# Patient Record
Sex: FEMALE | Race: BLACK OR AFRICAN AMERICAN | NOT HISPANIC OR LATINO | Employment: UNEMPLOYED | URBAN - METROPOLITAN AREA
[De-identification: names, ages, dates, MRNs, and addresses within clinical notes are randomized per-mention and may not be internally consistent; named-entity substitution may affect disease eponyms.]

---

## 2017-10-20 ENCOUNTER — HOSPITAL ENCOUNTER (EMERGENCY)
Facility: OTHER | Age: 52
Discharge: HOME | End: 2017-10-20
Attending: EMERGENCY MEDICINE
Payer: MEDICAID

## 2017-10-20 VITALS
TEMPERATURE: 98 F | RESPIRATION RATE: 18 BRPM | WEIGHT: 253 LBS | OXYGEN SATURATION: 100 % | SYSTOLIC BLOOD PRESSURE: 137 MMHG | BODY MASS INDEX: 38.34 KG/M2 | HEIGHT: 68 IN | DIASTOLIC BLOOD PRESSURE: 96 MMHG | HEART RATE: 87 BPM

## 2017-10-20 DIAGNOSIS — H60.501 ACUTE OTITIS EXTERNA OF RIGHT EAR, UNSPECIFIED TYPE: Primary | ICD-10-CM

## 2017-10-20 PROCEDURE — 99283 EMERGENCY DEPT VISIT LOW MDM: CPT

## 2017-10-20 RX ORDER — AMLODIPINE BESYLATE 5 MG/1
5 TABLET ORAL DAILY
COMMUNITY

## 2017-10-20 RX ORDER — NEOMYCIN SULFATE, POLYMYXIN B SULFATE AND HYDROCORTISONE 10; 3.5; 1 MG/ML; MG/ML; [USP'U]/ML
4 SUSPENSION/ DROPS AURICULAR (OTIC) 3 TIMES DAILY
Qty: 10 ML | Status: SHIPPED | OUTPATIENT
Start: 2017-10-20

## 2017-10-20 RX ORDER — LISINOPRIL 10 MG/1
10 TABLET ORAL DAILY
COMMUNITY

## 2017-10-20 NOTE — ED PROVIDER NOTES
Encounter Date: 10/20/2017       History     Chief Complaint   Patient presents with    Otalgia     The history is provided by the patient.   Otalgia   This is a recurrent problem. The current episode started two days ago. There is pain in the right ear. The problem occurs constantly. The problem has been unchanged. The pain is at a severity of 4/10. Associated symptoms include ear discharge. Pertinent negatives include no headaches, no hearing loss, no rhinorrhea, no sore throat, no abdominal pain, no diarrhea, no vomiting, no neck pain, no cough and no rash. Her past medical history does not include chronic ear infection.     Review of patient's allergies indicates:  No Known Allergies  Past Medical History:   Diagnosis Date    Hypertension      History reviewed. No pertinent surgical history.  History reviewed. No pertinent family history.  Social History   Substance Use Topics    Smoking status: Never Smoker    Smokeless tobacco: Never Used    Alcohol use No     Review of Systems   Constitutional: Negative.    HENT: Positive for ear discharge and ear pain. Negative for hearing loss, rhinorrhea and sore throat.    Eyes: Negative.    Respiratory: Negative.  Negative for cough.    Cardiovascular: Negative.    Gastrointestinal: Negative.  Negative for abdominal pain, diarrhea and vomiting.   Endocrine: Negative.    Genitourinary: Negative.    Musculoskeletal: Negative.  Negative for neck pain.   Skin: Negative.  Negative for rash.   Allergic/Immunologic: Negative.    Neurological: Negative.  Negative for headaches.   Hematological: Negative.    Psychiatric/Behavioral: Negative.    All other systems reviewed and are negative.      Physical Exam     Initial Vitals [10/20/17 0856]   BP Pulse Resp Temp SpO2   (!) 137/96 87 18 97.9 °F (36.6 °C) 100 %      MAP       109.67         Physical Exam    Nursing note and vitals reviewed.  Constitutional: Vital signs are normal. She is Obese . She is active and cooperative.    HENT:   Head: Normocephalic and atraumatic.   Right Ear: Hearing, tympanic membrane and external ear normal. There is drainage, swelling and tenderness. No foreign bodies. No mastoid tenderness. Tympanic membrane is not injected, not scarred, not perforated, not erythematous, not retracted and not bulging. Tympanic membrane mobility is normal. No middle ear effusion. No hemotympanum.   Left Ear: Hearing, tympanic membrane, external ear and ear canal normal.   Eyes: Conjunctivae, EOM and lids are normal. Pupils are equal, round, and reactive to light.   Neck: Trachea normal and full passive range of motion without pain. Neck supple. No thyroid mass present.   Cardiovascular: Normal rate, regular rhythm, S1 normal, S2 normal, normal heart sounds, intact distal pulses and normal pulses.   Abdominal: Soft. Normal appearance, normal aorta and bowel sounds are normal.   Musculoskeletal: Normal range of motion.   Lymphadenopathy:     She has no cervical adenopathy.     She has no axillary adenopathy.   Neurological: She is alert and oriented to person, place, and time.   Skin: Skin is warm, dry and intact.   Psychiatric: She has a normal mood and affect. Her speech is normal and behavior is normal. Judgment and thought content normal. Cognition and memory are normal.         ED Course   Procedures  Labs Reviewed - No data to display                            ED Course      Clinical Impression:   The encounter diagnosis was Acute otitis externa of right ear, unspecified type.                           Andrzej Bermudez MD  10/20/17 0946

## 2021-06-12 ENCOUNTER — HOSPITAL ENCOUNTER (EMERGENCY)
Facility: HOSPITAL | Age: 56
Discharge: HOME | End: 2021-06-12
Attending: EMERGENCY MEDICINE
Payer: MEDICAID

## 2021-06-12 VITALS
HEART RATE: 84 BPM | SYSTOLIC BLOOD PRESSURE: 182 MMHG | HEIGHT: 68 IN | TEMPERATURE: 99 F | WEIGHT: 256 LBS | DIASTOLIC BLOOD PRESSURE: 89 MMHG | OXYGEN SATURATION: 99 % | BODY MASS INDEX: 38.8 KG/M2 | RESPIRATION RATE: 20 BRPM

## 2021-06-12 DIAGNOSIS — S41.119A ARM LACERATION: Primary | ICD-10-CM

## 2021-06-12 PROCEDURE — 12001 PR RESUPERF WND BODY <2.5CM: ICD-10-PCS | Mod: LT,,, | Performed by: PHYSICIAN ASSISTANT

## 2021-06-12 PROCEDURE — 12001 RPR S/N/AX/GEN/TRNK 2.5CM/<: CPT

## 2021-06-12 PROCEDURE — 99283 EMERGENCY DEPT VISIT LOW MDM: CPT | Mod: 25

## 2021-06-12 PROCEDURE — 25000003 PHARM REV CODE 250: Performed by: PHYSICIAN ASSISTANT

## 2021-06-12 PROCEDURE — 12001 RPR S/N/AX/GEN/TRNK 2.5CM/<: CPT | Mod: LT,,, | Performed by: PHYSICIAN ASSISTANT

## 2021-06-12 PROCEDURE — 99284 EMERGENCY DEPT VISIT MOD MDM: CPT | Mod: 25,,, | Performed by: PHYSICIAN ASSISTANT

## 2021-06-12 PROCEDURE — 99284 PR EMERGENCY DEPT VISIT,LEVEL IV: ICD-10-PCS | Mod: 25,,, | Performed by: PHYSICIAN ASSISTANT

## 2021-06-12 RX ORDER — LIDOCAINE HYDROCHLORIDE 10 MG/ML
5 INJECTION, SOLUTION EPIDURAL; INFILTRATION; INTRACAUDAL; PERINEURAL
Status: COMPLETED | OUTPATIENT
Start: 2021-06-12 | End: 2021-06-12

## 2021-06-12 RX ADMIN — LIDOCAINE HYDROCHLORIDE 50 MG: 10 INJECTION, SOLUTION EPIDURAL; INFILTRATION; INTRACAUDAL at 11:06

## 2021-12-06 ENCOUNTER — HOSPITAL ENCOUNTER (EMERGENCY)
Facility: HOSPITAL | Age: 56
Discharge: HOME | End: 2021-12-06
Attending: EMERGENCY MEDICINE
Payer: MEDICAID

## 2021-12-06 VITALS
TEMPERATURE: 99 F | BODY MASS INDEX: 42.89 KG/M2 | SYSTOLIC BLOOD PRESSURE: 194 MMHG | HEIGHT: 68 IN | OXYGEN SATURATION: 99 % | DIASTOLIC BLOOD PRESSURE: 107 MMHG | RESPIRATION RATE: 20 BRPM | WEIGHT: 283 LBS | HEART RATE: 81 BPM

## 2021-12-06 DIAGNOSIS — M10.9 GOUT INVOLVING TOE OF RIGHT FOOT, UNSPECIFIED CAUSE, UNSPECIFIED CHRONICITY: Primary | ICD-10-CM

## 2021-12-06 DIAGNOSIS — M79.676 TOE PAIN: ICD-10-CM

## 2021-12-06 PROCEDURE — 25000003 PHARM REV CODE 250: Performed by: EMERGENCY MEDICINE

## 2021-12-06 PROCEDURE — 25000003 PHARM REV CODE 250: Performed by: PHYSICIAN ASSISTANT

## 2021-12-06 PROCEDURE — 99283 EMERGENCY DEPT VISIT LOW MDM: CPT

## 2021-12-06 PROCEDURE — 99284 PR EMERGENCY DEPT VISIT,LEVEL IV: ICD-10-PCS | Mod: ,,, | Performed by: PHYSICIAN ASSISTANT

## 2021-12-06 PROCEDURE — 99284 EMERGENCY DEPT VISIT MOD MDM: CPT | Mod: ,,, | Performed by: PHYSICIAN ASSISTANT

## 2021-12-06 RX ORDER — INDOMETHACIN 50 MG/1
50 CAPSULE ORAL
Qty: 30 CAPSULE | Refills: 0 | Status: SHIPPED | OUTPATIENT
Start: 2021-12-06 | End: 2021-12-16

## 2021-12-06 RX ORDER — ACETAMINOPHEN 325 MG/1
650 TABLET ORAL
Status: COMPLETED | OUTPATIENT
Start: 2021-12-06 | End: 2021-12-06

## 2021-12-06 RX ORDER — INDOMETHACIN 50 MG/1
50 CAPSULE ORAL
Status: COMPLETED | OUTPATIENT
Start: 2021-12-06 | End: 2021-12-06

## 2021-12-06 RX ADMIN — ACETAMINOPHEN 650 MG: 325 TABLET ORAL at 03:12

## 2021-12-06 RX ADMIN — INDOMETHACIN 50 MG: 50 CAPSULE ORAL at 03:12

## 2024-01-08 ENCOUNTER — TELEPHONE (OUTPATIENT)
Dept: GYNECOLOGIC ONCOLOGY | Facility: CLINIC | Age: 59
End: 2024-01-08
Payer: MEDICAID

## 2024-01-08 NOTE — TELEPHONE ENCOUNTER
Called x 2 and left voicemail to schedule with Dr. Otero. ----- Message from Shawnee Meeks RN sent at 1/8/2024 12:51 PM CST -----  Regarding: FW: Sarcoma  Please schedule for Dr. Otero. Thank you.    Molly-please request imaging to be shared for Dr. Otero' review.      Shawnee    ----- Message -----  From: Jermaine Otero MD  Sent: 1/8/2024  12:44 PM CST  To: Shawnee Meeks RN  Subject: RE: Sarcoma                                      I'm happy to see her.  Can we call her to schedule?  If so, Chip, can you please schedule?  Thank you.   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 Already metastatic. It was sent to our navigation team. Should likely see one of you. Would you like to take it?    Shawnee    ----- Message -----  From: Molly Cohn  Sent: 1/5/2024   1:59 PM CST  To: Shawnee Meeks RN  Subject: Sarcoma                                          New Cancer Patient Intake Documentation     Diagnosis: Sarcoma- Pelvis     Date patient referral received: self referral     Records collected: Care Everywhere     Questions asked:  What doctor have you seen for this diagnosis?  Dr. Khanna- Oncology  Dr. Gomez - GYN  DEXTER Terrell     What imaging have you had?   5/11 Us transvaginal - [facility]  5/26 MRI pelvis - [facility]  6/6 CT chest [facility]   6/6 CT abdomen  pelvis - [facility]  8/14 CT abdomen  pelvis- [facility]   8/14 CT chest - [facility]   10/17 CT abdomen pelvis- [facility]   10/17 CT Chest- [facility]      Have you been diagnosed with cancer by a biopsy and/or surgery?   5/24- pathology  6/7- surgical pathology  10/26- pathology      Other pertinent info: requesting imaging from [facility]     Date/time of Navigator Visit:

## 2024-01-09 ENCOUNTER — TELEPHONE (OUTPATIENT)
Dept: GYNECOLOGIC ONCOLOGY | Facility: CLINIC | Age: 59
End: 2024-01-09
Payer: MEDICAID

## 2024-01-09 NOTE — TELEPHONE ENCOUNTER
Spoke with our patient about her schedule appointment. ----- Message from Molly Cohn sent at 1/8/2024  1:37 PM CST -----  Regarding: RE: Sarcoma  Good afternoon,    The images were uploaded into the chart today.     -Molly   ----- Message -----  From: Shawnee Meeks RN  Sent: 1/8/2024  12:54 PM CST  To: Chip Molina MA; Molly Cohn; #  Subject: FW: Sarcoma                                      Please schedule for Dr. Otero. Thank you.    Molly-please request imaging to be shared for Dr. Otero' review.      Shawnee    ----- Message -----  From: Jermaine Otero MD  Sent: 1/8/2024  12:44 PM CST  To: Shawnee Meeks RN  Subject: RE: Sarcoma                                      I'm happy to see her.  Can we call her to schedule?  If so, Chip, can you please schedule?  Thank you.   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 Already metastatic. It was sent to our navigation team. Should likely see one of you. Would you like to take it?    Shawnee    ----- Message -----  From: Molly Cohn  Sent: 1/5/2024   1:59 PM CST  To: Shawnee Meeks RN  Subject: Sarcoma                                          New Cancer Patient Intake Documentation     Diagnosis: Sarcoma- Pelvis     Date patient referral received: self referral     Records collected: Care Everywhere     Questions asked:  What doctor have you seen for this diagnosis?  Dr. Khanna- Oncology  Dr. Gomez - GYN  DEXTER Terrell     What imaging have you had?   5/11 Us transvaginal - [facility]  5/26 MRI pelvis - [facility]  6/6 CT chest [facility]   6/6 CT abdomen  pelvis - [facility]  8/14 CT abdomen  pelvis- [facility]   8/14 CT chest - [facility]   10/17 CT abdomen pelvis- [facility]   10/17 CT Chest-  [facility]      Have you been diagnosed with cancer by a biopsy and/or surgery?   5/24- pathology  6/7- surgical pathology  10/26- pathology      Other pertinent info: requesting imaging from [facility]     Date/time of Navigator Visit:

## 2024-01-09 NOTE — TELEPHONE ENCOUNTER
Return call x2 left voicemail the provider can see the patient tomorrow at [facility] if that works with her schedule ----- Message from Corby Gonzalez RN sent at 1/9/2024  8:12 AM CST -----  Regarding: FW: Hugo  Contact: Pt  [phone]    ----- Message -----  From: Clover Guevara  Sent: 1/8/2024   1:47 PM CST  To: Branden Dean Staff  Subject: Appt                                                     Caller: Christy     Returning call to:Chip Matoser can be reached at: [phone]    Nature of the call:  Returning missed call to schedule appt

## 2024-01-11 PROBLEM — C55 LEIOMYOSARCOMA OF UTERUS: Status: ACTIVE | Noted: 2024-01-11

## 2024-01-16 ENCOUNTER — TELEPHONE (OUTPATIENT)
Dept: GYNECOLOGIC ONCOLOGY | Facility: CLINIC | Age: 59
End: 2024-01-16
Payer: MEDICAID

## 2024-02-01 NOTE — PROGRESS NOTES
REFERRING PROVIDER  Sita Khanna MD    REASON FOR CONSULT  Christy Berry  is a 58 y.o.  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    HISTORY OF PRESENT ILLNESS    Please refer below for the patient's tumor history.  The interval history is as follows: +PO. -N/V. +Flatus. +BM. -VB, VD, changes in stool or urine. -Abdominal or pelvic pain. -Unintentional weight loss.      Oncology History   Leiomyosarcoma of uterus   6/6/2023 Imaging Significant Findings    CT C/A/P w/o: Multiple lesions in the bone (5 cm manubrium, T7 vertebrae, iliac crest), lung (R lung 1.5 cm), and uterus     6/7/2023 Biopsy    CT guided biopsy of iliac crest: uLMS    SMA+, MSA+, desmin+, ER+. CD10+.      6/23/2023 - 10/12/2023 Chemotherapy    Doxorubicin/Trabectedin x6 c/b pancytopenia, neutropenic fever, CHF with multiple dose reductions and delay     8/14/2023 Imaging Significant Findings    CT C/A/P ?: ND in the uterus, SD in the bones     10/17/2023 Imaging Significant Findings    CT C/A/P ?: ND in the uterus, bones, and chest.     11/19/2023 -  Hormone Therapy    LZ 2.5 mg x          REVIEW OF SYSTEMS  All systems reviewed and negative except as noted in HPI.    OBJECTIVE   Vitals:    02/02/24 0955   BP: 120/76   Pulse: 77      Body mass index is 34.06 kg/m².      1. General: Well appearing, no apparent distress, alert and oriented.  2. Lymph: Neck symmetric without cervical or supraclavicular adenopathy or mass.  3. Lungs: Normal respiratory rate, no accessory muscle use.  4. Cardiac: Normal rate  5. Psych: Normal affect.  6. Abdomen:  non-distended, soft, non-tender, is a mass that is mobile below the umbilicus, no ascites, no hepatosplenomegaly.  7. Skin: Warm, dry, no rashes or lesions.   8. Extremities: Bilateral lower extremities without edema or tenderness.  9. Genitourinary               Pelvic Examination including:                a. External genitalia are normal in appearance. No lesions noted.               b. Urethral  meatus is normal size, location, and appearance.               c. Urethra is negative.               d. Bladder is nontender. No masses noted.               e. Vagina has normal mucosa with physiologic discharge. No lesions noted.              f. Uterus is enlarged and mobile              g. Adnexa normal   h. Rectum deferred    ECOG status: 0    LABORATORY DATA  Lab data reviewed.    RADIOLOGICAL DATA  Radiology data reviewed.    ASSESSMENT / PLAN     1. Leiomyosarcoma of uterus    2. Congestive heart failure, unspecified HF chronicity, unspecified heart failure type         We reviewed her clinical course including pathology and imaging.  Her pathology was reviewed by GABY.  We then reviewed the natural course of uLMS.  We discussed that her disease is incurable and that RR to single agent chemotherapies are in the range of 10-30%.  We reviewed the results of GeDDis, TDIU272, LMS-04, ANNOUNCE.  We discussed that treatment options depend on performance status and dose limiting toxicities.  I usually favor gemcitabine/docetaxel in these situations but she isn't a candidate due to persistent grade 3 leukopenia.  The source and reason for this isn't entirely clear, and I would consider a Hematology referral to rule out bone marrow pathology.  Since she really isn't a candidate for cytotoxic chemotherapy, I think it is reasonable to continue hormonal therapy.  I would also consider repeating a biopsy and sending it for outside review and genomic testing.  We discussed that for symptomatic management of her bone metastases that I would continue with her infusions and consider palliative radiotherapy if she becomes symptomatic.      The patient and family were under the impression that her disease is curable and she is potentially a candidate for a hysterectomy.  There is no high-level evidence demonstrating a benefit to hysterectomy in these situations.      After an extensive discussion she wishes to resume her care at  [facility].  All questions were answered, and she voiced understanding.     PATIENT EDUCATION  Ready to learn, no apparent learning barriers were identified; learning preferences include listening. Explained diagnosis and treatment plan; patient expressed understanding of the content.    ADMINISTRATIVE BILLING  Greater than 50% of was spent in counseling.       Jermaine Otero

## 2024-02-02 ENCOUNTER — OFFICE VISIT (OUTPATIENT)
Dept: GYNECOLOGIC ONCOLOGY | Facility: CLINIC | Age: 59
End: 2024-02-02
Payer: MEDICAID

## 2024-02-02 VITALS
SYSTOLIC BLOOD PRESSURE: 120 MMHG | HEART RATE: 77 BPM | DIASTOLIC BLOOD PRESSURE: 76 MMHG | BODY MASS INDEX: 33.95 KG/M2 | WEIGHT: 224 LBS | HEIGHT: 68 IN

## 2024-02-02 DIAGNOSIS — I50.9 CONGESTIVE HEART FAILURE, UNSPECIFIED HF CHRONICITY, UNSPECIFIED HEART FAILURE TYPE: ICD-10-CM

## 2024-02-02 DIAGNOSIS — C55 LEIOMYOSARCOMA OF UTERUS: Primary | ICD-10-CM

## 2024-02-02 PROCEDURE — 99205 OFFICE O/P NEW HI 60 MIN: CPT | Mod: S$PBB,,, | Performed by: OBSTETRICS & GYNECOLOGY

## 2024-02-02 PROCEDURE — 4010F ACE/ARB THERAPY RXD/TAKEN: CPT | Mod: CPTII,,, | Performed by: OBSTETRICS & GYNECOLOGY

## 2024-02-02 PROCEDURE — 99213 OFFICE O/P EST LOW 20 MIN: CPT | Mod: PBBFAC | Performed by: OBSTETRICS & GYNECOLOGY

## 2024-02-02 PROCEDURE — 3008F BODY MASS INDEX DOCD: CPT | Mod: CPTII,,, | Performed by: OBSTETRICS & GYNECOLOGY

## 2024-02-02 PROCEDURE — 1159F MED LIST DOCD IN RCRD: CPT | Mod: CPTII,,, | Performed by: OBSTETRICS & GYNECOLOGY

## 2024-02-02 PROCEDURE — 99999 PR PBB SHADOW E&M-EST. PATIENT-LVL III: CPT | Mod: PBBFAC,,, | Performed by: OBSTETRICS & GYNECOLOGY

## 2024-02-02 PROCEDURE — 3078F DIAST BP <80 MM HG: CPT | Mod: CPTII,,, | Performed by: OBSTETRICS & GYNECOLOGY

## 2024-02-02 PROCEDURE — 3074F SYST BP LT 130 MM HG: CPT | Mod: CPTII,,, | Performed by: OBSTETRICS & GYNECOLOGY

## 2024-02-02 RX ORDER — METOPROLOL TARTRATE 25 MG/1
25 TABLET, FILM COATED ORAL
COMMUNITY
Start: 2023-11-02

## 2024-02-02 RX ORDER — OXYCODONE HYDROCHLORIDE 5 MG/1
5 TABLET ORAL EVERY 4 HOURS PRN
COMMUNITY
Start: 2023-11-01

## 2024-02-02 RX ORDER — ONDANSETRON 4 MG/1
4 TABLET, ORALLY DISINTEGRATING ORAL EVERY 6 HOURS PRN
COMMUNITY
Start: 2023-08-03

## 2024-02-02 RX ORDER — TRAMADOL HYDROCHLORIDE 50 MG/1
50 TABLET ORAL EVERY 6 HOURS PRN
COMMUNITY
Start: 2023-07-17

## 2024-02-02 RX ORDER — APIXABAN 2.5 MG/1
2.5 TABLET, FILM COATED ORAL 2 TIMES DAILY
COMMUNITY

## 2024-02-02 RX ORDER — LETROZOLE 2.5 MG/1
2.5 TABLET, FILM COATED ORAL
COMMUNITY

## 2024-02-02 RX ORDER — FAMOTIDINE 20 MG/1
20 TABLET, FILM COATED ORAL
COMMUNITY
Start: 2023-09-08

## 2024-02-07 ENCOUNTER — TELEPHONE (OUTPATIENT)
Dept: GYNECOLOGIC ONCOLOGY | Facility: CLINIC | Age: 59
End: 2024-02-07
Payer: MEDICAID